# Patient Record
Sex: FEMALE | Race: WHITE
[De-identification: names, ages, dates, MRNs, and addresses within clinical notes are randomized per-mention and may not be internally consistent; named-entity substitution may affect disease eponyms.]

---

## 2018-10-24 ENCOUNTER — HOSPITAL ENCOUNTER (OUTPATIENT)
Dept: HOSPITAL 47 - RADUSWWP | Age: 43
Discharge: HOME | End: 2018-10-24
Payer: COMMERCIAL

## 2018-10-24 DIAGNOSIS — N83.201: Primary | ICD-10-CM

## 2018-10-24 DIAGNOSIS — Z90.710: ICD-10-CM

## 2018-10-24 PROCEDURE — 76700 US EXAM ABDOM COMPLETE: CPT

## 2018-10-24 PROCEDURE — 76856 US EXAM PELVIC COMPLETE: CPT

## 2018-10-24 PROCEDURE — 76830 TRANSVAGINAL US NON-OB: CPT

## 2018-10-24 NOTE — US
EXAMINATION TYPE: US abdomen complete

 

DATE OF EXAM: 10/24/2018

 

COMPARISON: NONE

 

CLINICAL HISTORY: R10.9 Abd pain. llq/pelvic pain since hysterectomy Feb 2018

 

EXAM MEASUREMENTS:

 

Liver Length:  16.4 cm   

Gallbladder Wall:  0.2 cm   

CBD:  0.3 cm

Spleen:  12.1 cm   

Right Kidney:  10.5 x 5.9 x 5.9 cm 

Left Kidney:  11.9 x 4.2 x 5.4 cm   

 

 

 

Pancreas:  wnl

Liver:  wnl  

Gallbladder:  wnl

**Evidence for sonographic Beltran's sign:  no

CBD:  wnl 

Spleen:  wnl   

Right Kidney:  wnl   

Left Kidney:  wnl   

Upper IVC:  wnl  

Abd Aorta:  wnl

 

 

 

The liver is homogenous.  The intrahepatic portion of the IVC and proximal abdominal aorta are within
 normal limits.  There is no evidence of cholelithiasis.  Common bile duct is unremarkable.  The visu
alized portions of the pancreas are homogenous.  The spleen is unremarkable.  Kidneys are symmetric a
nd free of hydronephrosis.  No renal lesions are seen.

 

IMPRESSION: No significant abnormality is evident

## 2020-01-22 NOTE — US
EXAMINATION TYPE: US pelvis complete transvag

 

DATE OF EXAM: 10/24/2018

 

COMPARISON: Prior ultrasound 8/25/2010 is incompletely

 

CLINICAL HISTORY: R10.9 Abd pain. LLQ pain since hysterectomy Feb 2018

 

TECHNIQUE:  TA.  Transabdominal sonographic images of the pelvis were acquired.  Transvaginal sonogra
phic images were medically necessary to better assess the following anatomy: cervix and bilat ovaries


 

Date of LMP:  Jan 2018

 

EXAM MEASUREMENTS:

 

Uterus: Surgically absent 

Endometrial Stripe: Surgically absent 

Right Ovary:  4.2 x 3.8 x 3.7 cm

Left Ovary:  2.5 x 2.3 x 1.7 cm

 

 

 

1. Uterus: hysterectomy, nabothian cyst seen within cervix, largest = 1.0cm

2. Endometrium:  Surgically absent

3. Right Ovary:  3.6cm complex cyst seen, internal echoes are noted, color flow noted peripherally

4. Left Ovary:  follicles seen

5. Bilateral Adnexa:  wnl

6. Posterior cul-de-sac:  wnl

 

 

 

IMPRESSION: Postop changes. Cystic focus associated with the right ovary is indeterminate, follow-up 
suggested, consider hemorrhagic cyst, endometrioma 9

## 2025-01-29 NOTE — USB
Reason for Exam: Clinical finding. 





Patient History: 

Menarche at age 16. First Full-Term Pregnancy at age 21. Hysterectomy at age 42.

Maternal aunt had breast cancer, age 29. 





Risk Values: 

Iris 5 year model risk: 0.7%.

NCI Lifetime model risk: 7.6%.

Technique: 

Method: Targeted.  





Findings: 

The upper outer quadrant of the left breast, the axilla of both breasts and the retroareolar of both

breasts were scanned.

Technique utilized:US breast limited BILAT Image; Ultrasound imaging of: All 4 quadrants, the

retroareolar region and axilla.



Right breast 12:00 3 cm from the nipple possible correlating with finding on mammography however the

distance is not exactly seen. Short-term follow-up ultrasound and mammography recommended for this

lesion in 3 months. This is felt to represent clustered cysts. Area measures up to 12 x 10 x 5 mm.



Left breast palpable abnormality correlating with suspected dense fibroglandular tissue. This is

somewhat asymmetric on mammography. Follow-up 3 months recommended for this area. This is felt to

represent dense fibroglandular tissue however there are cystic changes within this area. This area

measures up to 5.7 x 2.0 cm. 





Overall Assessment: Probably benign, BI-RAD 3





Management: 

Diagnostic Mammogram of both breasts in 3 months.

Diagnostic Breast Ultrasound of both breasts in 3 months.

A clinical breast exam by your physician is recommended on an annual basis and results should be

correlated with mammographic findings.  This exam should not preclude additional follow-up of

suspicious palpable abnormalities.  Results were given to the patient verbally at the time of exam.







[facility], Workstation: [id number], 1/29/2025 2:25 PM.



Electronically signed and approved by: Del Wlilard DO

## 2025-01-29 NOTE — MM
Reason for Exam: Clinical finding. 





Patient History: 

Menarche at age 16. First Full-Term Pregnancy at age 21. Hysterectomy at age 42.

Maternal aunt had breast cancer, age 29. 





Risk Values: 

Iris 5 year model risk: 0.7%.

NCI Lifetime model risk: 7.6%.





Tissue Density: 

There are scattered areas of fibroglandular density.





Findings: 

Analyzed By CAD. 

Left: Palpable mass left breast upper outer quadrant possibly measuring 61 x 35 mm 8.6 cm from the

nipple.



Right: Irregular shaped masslike area with calculi at 10 cm from the nipple posterior nipple line on

RCC view slightly superior on MLO suggesting around 12:00. 





Overall Assessment: Incomplete: need additional imaging evaluation, BI-RAD 0





Management: 

Diagnostic Breast Ultrasound of both breasts.

Results were given to the patient verbally at the time of exam.



Patient should continue monthly self-breast exams.  A clinical breast exam by your physician is

recommended on an annual basis.

This exam should not preclude additional follow-up of suspicious palpable abnormalities.





Note on Iris scores and lifetime risk:

1. A Iris score greater than 3% is considered moderate risk. If this is the case, consider

specialist referral to assess eligibility for a risk reducing agent.

2. If overall lifetime risk for the development of breast cancer is 20% or higher, the patient may

qualify for future screening with alternating mammogram and breast MRI.



[facility], Workstation: [id number], 1/29/2025 1:41 PM.



Electronically signed and approved by: Del Willard DO